# Patient Record
Sex: FEMALE | Race: BLACK OR AFRICAN AMERICAN
[De-identification: names, ages, dates, MRNs, and addresses within clinical notes are randomized per-mention and may not be internally consistent; named-entity substitution may affect disease eponyms.]

---

## 2018-03-06 ENCOUNTER — HOSPITAL ENCOUNTER (OUTPATIENT)
Dept: HOSPITAL 68 - STS | Age: 78
End: 2018-03-06
Payer: COMMERCIAL

## 2018-03-06 VITALS — WEIGHT: 160 LBS | BODY MASS INDEX: 32.25 KG/M2 | HEIGHT: 59 IN

## 2018-03-06 DIAGNOSIS — Z53.09: ICD-10-CM

## 2018-03-06 DIAGNOSIS — Z01.810: Primary | ICD-10-CM

## 2018-03-06 DIAGNOSIS — H25.12: ICD-10-CM

## 2018-03-06 NOTE — OPERATIVE REPORT
Operative/Inv Procedure Report
Surgery Date: 03/06/18
Name of Procedure:
Cataract extraction lens implantation left eye
Pre-Operative Diagnosis:
Age related cataract left eye 20/60 vision
Post-Operative Diagnosis:
Same
Estimated Blood Loss: none
Surgeon/Assistant:
Mert KENNEY,Jim MANE
 
Anesthesia: local monitored anesthesi
Complications:
Patient was brought to the operating room of the intravenous line that had been 
established in the preop area was now functioning properly.  Patient's blood 
pressure was 253/105.  Deemed to be unsafe especially without intravenous 
access.  All attempts were made to gain intravenous access unsuccessfully.  Then
brought to the back to the preoperative area where again the intra-into 
operative venous access could not be established the patient was given oral 
blood pressure medications but vomited them up.  The case was canceled and the 
patient was taken to the emergency room.  I after today whether to bring the 
patient back and try this again under different circumstances.
 
Operative/Procedure Note
Note:
The patient was brought to the operating room standard monitoring equipment was 
attached the patient was prepped and draped in the usual fashion for intraocular
surgery.  A lid speculum was placed to retract the lids.  The case was begun by 
making a temporal incision with a 2.4 mm keratome.  The eye was stabilized with 
a Nash ring during this incision.  1 mL of non-preserved lidocaine was 
introduced into the anterior chamber to provide anesthesia.  The anterior 
chamber was then filled and deepened with viscoelastic.  A curvilinear 
capsulorrhexis was achieved using a 30-gauge needle and is a cystotome and 
capsulorrhexis was finished using a Utrata forceps.  A second or paracentesis 
incision was made temporally with a 1 mm MVR blade.  The lens was then 
hydrodissected with balanced salt solution and found to be rotatable.  The lens 
was emulsified using phacoemulsification and a modified four-quadrant cracking 
technique.  The residual cortical material was removed using automated 
irrigation and aspiration and as much of the anterior capsular rim was cleaned 
as well as possible.  The posterior capsule was cleaned first with the automated
machine on a low setting and then manually with a Jori squeegee.  The capsular 
bag was deepened with viscoelastic.  The lens a Technis 1 21.0  Diopter placed 
into the bag under direct visualization and rotated so that the haptics were at 
12 and 6:00.  Viscoelastic was then removed from the eye by flushing it out and 
then by automated irrigation and aspiration.  The eye was pressurized to a 
normal tone.  1/10 of a cc of vancomycin solution was introduced into the 
anterior chamber to provide antibiotic prophylaxis.  The wounds were sealed by 
hydrating the stroma adjacent to them and the eye was left at a proper tone 
after the wounds were checked and found not to be leaking.  The lid speculum was
removed from the orbit.  Antibiotic and steroid drops were placed on the eye and
then the eye was shielded.  Monitoring equipment was removed from the patient 
and the patient was removed from the operative suite to the holding area.  The 
patient tolerated the procedure well and will be seen in the office tomorrow.

## 2018-03-27 ENCOUNTER — HOSPITAL ENCOUNTER (OUTPATIENT)
Dept: HOSPITAL 68 - STS | Age: 78
End: 2018-03-27
Payer: COMMERCIAL

## 2018-03-27 VITALS — BODY MASS INDEX: 32.25 KG/M2 | WEIGHT: 160 LBS | HEIGHT: 59 IN

## 2018-03-27 DIAGNOSIS — I10: ICD-10-CM

## 2018-03-27 DIAGNOSIS — I99.9: ICD-10-CM

## 2018-03-27 DIAGNOSIS — H25.9: Primary | ICD-10-CM

## 2018-03-27 PROCEDURE — V2632 POST CHMBR INTRAOCULAR LENS: HCPCS

## 2018-03-27 NOTE — OPERATIVE REPORT
Operative/Inv Procedure Report
Surgery Date: 03/27/18
Name of Procedure:
Cataract extraction lens implantation left eye
Pre-Operative Diagnosis:
Age-related cataract left eye 20/60 vision
Post-Operative Diagnosis:
Same
Estimated Blood Loss: none
Surgeon/Assistant:
Mert KENNEY,Jim MANE
 
Anesthesia: local monitored anesthesi
Complications:
None
 
 
Operative/Procedure Note
Note:
The patient was brought to the operating room standard monitoring equipment was 
attached the patient was prepped and draped in the usual fashion for intraocular
surgery.  A lid speculum was placed to retract the lids.  The case was begun by 
making a temporal incision with a 2.4 mm keratome.  The eye was stabilized with 
a Nash ring during this incision.  1 mL of non-preserved lidocaine was 
introduced into the anterior chamber to provide anesthesia.  The anterior 
chamber was then filled and deepened with viscoelastic.  A curvilinear 
capsulorrhexis was achieved using a 30-gauge needle and is a cystotome and 
capsulorrhexis was finished using a Utrata forceps.  A second or paracentesis 
incision was made temporally with a 1 mm MVR blade.  The lens was then 
hydrodissected with balanced salt solution and found to be rotatable.  The lens 
was emulsified using phacoemulsification and a modified four-quadrant cracking 
technique.  The residual cortical material was removed using automated 
irrigation and aspiration and as much of the anterior capsular rim was cleaned 
as well as possible.  The posterior capsule was cleaned first with the automated
machine on a low setting and then manually with a Jori squeegee.  The capsular 
bag was deepened with viscoelastic.  The lens a Technis 1 21.0  Diopter placed 
into the bag under direct visualization and rotated so that the haptics were at 
12 and 6:00.  Viscoelastic was then removed from the eye by flushing it out and 
then by automated irrigation and aspiration.  The eye was pressurized to a 
normal tone.  1/10 of a cc of vancomycin solution was introduced into the 
anterior chamber to provide antibiotic prophylaxis.  The wounds were sealed by 
hydrating the stroma adjacent to them and the eye was left at a proper tone 
after the wounds were checked and found not to be leaking.  The lid speculum was
removed from the orbit.  Antibiotic and steroid drops were placed on the eye and
then the eye was shielded.  Monitoring equipment was removed from the patient 
and the patient was removed from the operative suite to the holding area.  The 
patient tolerated the procedure well and will be seen in the office tomorrow.